# Patient Record
Sex: FEMALE | Race: WHITE | Employment: OTHER | URBAN - NONMETROPOLITAN AREA
[De-identification: names, ages, dates, MRNs, and addresses within clinical notes are randomized per-mention and may not be internally consistent; named-entity substitution may affect disease eponyms.]

---

## 2023-02-10 ENCOUNTER — HOSPITAL ENCOUNTER (OUTPATIENT)
Dept: CT IMAGING | Age: 68
Discharge: HOME | End: 2023-02-10

## 2023-02-10 DIAGNOSIS — Z00.6 ENCOUNTER FOR EXAMINATION FOR NORMAL COMPARISON AND CONTROL IN CLINICAL RESEARCH PROGRAM: ICD-10-CM

## 2023-02-10 RX ORDER — AMOXICILLIN AND CLAVULANATE POTASSIUM 875; 125 MG/1; MG/1
1 TABLET, FILM COATED ORAL 2 TIMES DAILY
COMMUNITY
End: 2023-02-13

## 2023-02-10 RX ORDER — METHYLPREDNISOLONE 4 MG/1
2 TABLET ORAL DAILY
COMMUNITY
End: 2023-02-13

## 2023-02-10 RX ORDER — GEMFIBROZIL 600 MG/1
600 TABLET, FILM COATED ORAL
COMMUNITY

## 2023-02-10 RX ORDER — LOSARTAN POTASSIUM 25 MG/1
25 TABLET ORAL DAILY
COMMUNITY

## 2023-02-10 RX ORDER — VITAMIN B COMPLEX
2000 TABLET ORAL DAILY
COMMUNITY

## 2023-02-10 RX ORDER — LANOLIN ALCOHOL/MO/W.PET/CERES
500 CREAM (GRAM) TOPICAL NIGHTLY
COMMUNITY

## 2023-02-10 RX ORDER — METRONIDAZOLE 500 MG/1
500 TABLET ORAL 3 TIMES DAILY
COMMUNITY
End: 2023-02-13

## 2023-02-10 RX ORDER — CIPROFLOXACIN 500 MG/1
500 TABLET, FILM COATED ORAL 2 TIMES DAILY
COMMUNITY
End: 2023-02-13

## 2023-02-10 RX ORDER — BISOPROLOL FUMARATE AND HYDROCHLOROTHIAZIDE 10; 6.25 MG/1; MG/1
1 TABLET ORAL DAILY
COMMUNITY

## 2023-02-10 RX ORDER — ACYCLOVIR 800 MG/1
800 TABLET ORAL 2 TIMES DAILY
COMMUNITY
End: 2023-02-13

## 2023-02-13 ENCOUNTER — OFFICE VISIT (OUTPATIENT)
Dept: SURGERY | Age: 68
End: 2023-02-13
Payer: MEDICARE

## 2023-02-13 ENCOUNTER — HOSPITAL ENCOUNTER (OUTPATIENT)
Age: 68
Discharge: HOME | End: 2023-02-13
Payer: MEDICARE

## 2023-02-13 VITALS
TEMPERATURE: 97 F | BODY MASS INDEX: 32.69 KG/M2 | SYSTOLIC BLOOD PRESSURE: 133 MMHG | DIASTOLIC BLOOD PRESSURE: 64 MMHG | HEIGHT: 60 IN | WEIGHT: 166.5 LBS | HEART RATE: 68 BPM | OXYGEN SATURATION: 97 %

## 2023-02-13 DIAGNOSIS — I10 ESSENTIAL HYPERTENSION: ICD-10-CM

## 2023-02-13 DIAGNOSIS — G89.29 CHRONIC BILATERAL LOW BACK PAIN, UNSPECIFIED WHETHER SCIATICA PRESENT: ICD-10-CM

## 2023-02-13 DIAGNOSIS — Z01.818 PRE-OP TESTING: ICD-10-CM

## 2023-02-13 DIAGNOSIS — M54.50 CHRONIC BILATERAL LOW BACK PAIN, UNSPECIFIED WHETHER SCIATICA PRESENT: ICD-10-CM

## 2023-02-13 DIAGNOSIS — K40.30 INCARCERATED LEFT INGUINAL HERNIA: ICD-10-CM

## 2023-02-13 DIAGNOSIS — K40.30 INCARCERATED LEFT INGUINAL HERNIA: Primary | ICD-10-CM

## 2023-02-13 LAB
ANION GAP SERPL CALC-SCNC: 10 MEQ/L (ref 8–16)
BUN SERPL-MCNC: 19 MG/DL (ref 7–22)
CALCIUM SERPL-MCNC: 10 MG/DL (ref 8.5–10.5)
CHLORIDE SERPL-SCNC: 99 MEQ/L (ref 98–111)
CO2 SERPL-SCNC: 28 MEQ/L (ref 23–33)
CREAT SERPL-MCNC: 0.7 MG/DL (ref 0.4–1.2)
GFR SERPL CREATININE-BSD FRML MDRD: > 60 ML/MIN/1.73M2
GLUCOSE SERPL-MCNC: 142 MG/DL (ref 70–108)
HCT VFR BLD AUTO: 41.1 % (ref 37–47)
HGB BLD-MCNC: 13.5 GM/DL (ref 12–16)
POTASSIUM SERPL-SCNC: 4.6 MEQ/L (ref 3.5–5.2)
SODIUM SERPL-SCNC: 137 MEQ/L (ref 135–145)

## 2023-02-13 PROCEDURE — G8484 FLU IMMUNIZE NO ADMIN: HCPCS | Performed by: SURGERY

## 2023-02-13 PROCEDURE — 3078F DIAST BP <80 MM HG: CPT | Performed by: SURGERY

## 2023-02-13 PROCEDURE — 85018 HEMOGLOBIN: CPT

## 2023-02-13 PROCEDURE — 3017F COLORECTAL CA SCREEN DOC REV: CPT | Performed by: SURGERY

## 2023-02-13 PROCEDURE — G8417 CALC BMI ABV UP PARAM F/U: HCPCS | Performed by: SURGERY

## 2023-02-13 PROCEDURE — 1123F ACP DISCUSS/DSCN MKR DOCD: CPT | Performed by: SURGERY

## 2023-02-13 PROCEDURE — G8427 DOCREV CUR MEDS BY ELIG CLIN: HCPCS | Performed by: SURGERY

## 2023-02-13 PROCEDURE — 3075F SYST BP GE 130 - 139MM HG: CPT | Performed by: SURGERY

## 2023-02-13 PROCEDURE — 93010 ELECTROCARDIOGRAM REPORT: CPT | Performed by: INTERNAL MEDICINE

## 2023-02-13 PROCEDURE — 36415 COLL VENOUS BLD VENIPUNCTURE: CPT

## 2023-02-13 PROCEDURE — G8400 PT W/DXA NO RESULTS DOC: HCPCS | Performed by: SURGERY

## 2023-02-13 PROCEDURE — 93005 ELECTROCARDIOGRAM TRACING: CPT

## 2023-02-13 PROCEDURE — 80048 BASIC METABOLIC PNL TOTAL CA: CPT

## 2023-02-13 PROCEDURE — 85014 HEMATOCRIT: CPT

## 2023-02-13 PROCEDURE — 1090F PRES/ABSN URINE INCON ASSESS: CPT | Performed by: SURGERY

## 2023-02-13 PROCEDURE — 1036F TOBACCO NON-USER: CPT | Performed by: SURGERY

## 2023-02-13 PROCEDURE — 99203 OFFICE O/P NEW LOW 30 MIN: CPT | Performed by: SURGERY

## 2023-02-13 RX ORDER — AMPICILLIN TRIHYDRATE 250 MG
200 CAPSULE ORAL DAILY
COMMUNITY

## 2023-02-13 RX ORDER — ACETAMINOPHEN 325 MG/1
650 TABLET ORAL EVERY 6 HOURS PRN
COMMUNITY

## 2023-02-13 RX ORDER — COVID-19 ANTIGEN TEST
KIT MISCELLANEOUS DAILY
COMMUNITY

## 2023-02-13 RX ORDER — POTASSIUM CITRATE 10 MEQ/1
10 TABLET, EXTENDED RELEASE ORAL 2 TIMES DAILY
COMMUNITY

## 2023-02-13 RX ORDER — PANTOPRAZOLE SODIUM 40 MG/1
TABLET, DELAYED RELEASE ORAL
COMMUNITY
Start: 2023-01-18

## 2023-02-13 ASSESSMENT — ENCOUNTER SYMPTOMS
COLOR CHANGE: 0
COUGH: 0
BLOOD IN STOOL: 0
VOICE CHANGE: 0
NAUSEA: 0
WHEEZING: 0
VOMITING: 0
BACK PAIN: 1
SHORTNESS OF BREATH: 0
SORE THROAT: 0
ABDOMINAL PAIN: 0
TROUBLE SWALLOWING: 0

## 2023-02-13 NOTE — PROGRESS NOTES
Rody Reynolds MD   General Surgery  New Patient Evaluation in Office  Pt Name: Perla Colón  Date of Birth 1955   Today's Date: 2/13/2023  Medical Record Number: [id number]  Referring Provider: Dr. Beulah Fay  Primary Care Provider: Laila William MD  Chief Complaint:  Chief Complaint   Patient presents with    Surgical Consult     New patient-referred by Dr Umair Spivey groin lump       ASSESSMENT      1. Incarcerated left inguinal hernia    2. Essential hypertension    3. Pre-op testing    4. Chronic bilateral low back pain, unspecified whether sciatica present         PLANS      Clinical examination consistent with incarcerated left inguinal hernia  Recommend surgical repair due to persistent symptoms and incarceration. Open and minimally invasive approaches discussed. Robotic assisted laparoscopic approach discussed. Recommend robotic assisted laparoscopic, possible open repair of symptomatic incarcerated left inguinal hernia. Patient is agreeable. Preoperative testing including EKG and lab work  General anesthesia  Risks of surgery discussed and include but not limited to bleeding, infection, recurrent hernia, persistent pain as well as potential though very low risk for mesh complications. Utilization of mesh discussed as well advantages potential disadvantages. Potential for organ injury bowel injury bladder injury reviewed but low risk. All questions answered patient wishes to proceed. Can Delarosa is a 79y.o. year old female who is presenting today in the office for evaluation of a persistent lump in the left groin. She noted it around August and has never gone away. It is a little bit softer than it used to be. She denies any real change in bowel or urinary habits. She has a history of diverticulosis with intermittent episodes of mild diverticulitis. She has never had an admission for this. Previous colonoscopies have been unremarkable. Diverticulosis benign polyp.   Madelaine Ladpamela has chronic lower back pain. She is due for an SI joint injection. She has had previous back surgery and a torn gluteus medius on the left side. She thought the discomfort which radiates into her groin may be related to her back. She has had no change in bowel or urinary habits. On examination the palpable mass in the left groin is not reducible and consistent with an incarcerated groin hernia. Prior CT imaging reviewed there does appear to be a widened mouth at the internal ring with some contents at that time. Patient is agreeable to surgical intervention she has persistent symptoms of discomfort in the area. There is a remote history of a PE. Hypercoagulable work-up negative. Performed in [place] in 2007.     Past Medical History  Past Medical History:   Diagnosis Date    Arthritis     Diverticulosis     Hyperlipidemia     Hypertension     Kidney stone     Pulmonary embolism [facility])        Past Surgical History  Past Surgical History:   Procedure Laterality Date    COLONOSCOPY  11/2022    COLONOSCOPY  2012    ESOPHAGOGASTRODUODENOSCOPY  2012    LITHOTRIPSY      LUMBAR FUSION      OTHER SURGICAL HISTORY      repair of left gluteus medius tear    OTHER SURGICAL HISTORY      SIJ injection       Medications  Current Outpatient Medications   Medication Sig Dispense Refill    pantoprazole (PROTONIX) 40 MG tablet TAKE 1 TABLET BY MOUTH ONCE DAILY      potassium citrate (UROCIT-K) 10 MEQ (1080 MG) extended release tablet Take 10 mEq by mouth in the morning and at bedtime      Coenzyme Q10 (COQ10) 200 MG CAPS Take 200 mg by mouth daily      Naproxen Sodium (ALEVE) 220 MG CAPS Take by mouth daily      acetaminophen (TYLENOL) 325 MG tablet Take 650 mg by mouth every 6 hours as needed for Pain      aspirin 325 MG EC tablet Take 325 mg by mouth daily      bisoprolol-hydroCHLOROthiazide (ZIAC) 10-6.25 MG per tablet Take 1 tablet by mouth daily      gemfibrozil (LOPID) 600 MG tablet Take 600 mg by mouth 2 times daily (before meals)      losartan (COZAAR) 25 MG tablet Take 25 mg by mouth daily      niacin (SLO-NIACIN) 500 MG extended release tablet Take 500 mg by mouth nightly      Vitamin D (CHOLECALCIFEROL) 25 MCG (1000 UT) TABS tablet Take 2,000 Units by mouth daily       No current facility-administered medications for this visit. Allergies     Allergies   Allergen Reactions    Cephalosporins Anaphylaxis    Cymbalta [Duloxetine Hcl] Hives    Gabapentin Hives       Family History  Family History   Problem Relation Age of Onset    Heart Failure Mother     Heart Failure Father        SocialHistory  Social History     Socioeconomic History    Marital status:      Spouse name: Not on file    Number of children: Not on file    Years of education: Not on file    Highest education level: Not on file   Occupational History    Not on file   Tobacco Use    Smoking status: Never    Smokeless tobacco: Never   Substance and Sexual Activity    Alcohol use: Not Currently    Drug use: Never    Sexual activity: Not on file   Other Topics Concern    Not on file   Social History Narrative    Not on file     Social Determinants of Health     Financial Resource Strain: Not on file   Food Insecurity: Not on file   Transportation Needs: Not on file   Physical Activity: Not on file   Stress: Not on file   Social Connections: Not on file   Intimate Partner Violence: Not on file   Housing Stability: Not on file           Review of Systems  Review of Systems   Constitutional:  Negative for chills, fatigue, fever and unexpected weight change. HENT:  Negative for sore throat, trouble swallowing and voice change. Eyes:  Negative for visual disturbance. Respiratory:  Negative for cough, shortness of breath and wheezing. Cardiovascular:  Negative for chest pain and palpitations. Gastrointestinal:  Negative for abdominal pain, blood in stool, nausea and vomiting.    Endocrine: Negative for cold intolerance, heat intolerance and polydipsia. Genitourinary:  Negative for dysuria, flank pain and hematuria. Musculoskeletal:  Positive for back pain and gait problem. Negative for joint swelling and myalgias. Skin:  Negative for color change and rash. Allergic/Immunologic: Negative for immunocompromised state. Neurological:  Negative for dizziness, tremors, seizures and speech difficulty. Hematological:  Does not bruise/bleed easily. Psychiatric/Behavioral:  Negative for agitation, behavioral problems and confusion. Reports chronic gait problems since back surgery in the past.  OBJECTIVE     /64 (Site: Left Upper Arm, Position: Sitting, Cuff Size: Medium Adult)   Pulse 68   Temp 97 °F (36.1 °C) (Temporal)   Ht 5' (1.524 m)   Wt 166 lb 8 oz (75.5 kg)   SpO2 97%   BMI 32.52 kg/m²      Physical Exam  Vitals reviewed. Constitutional:       Appearance: Normal appearance. She is well-developed. She is not diaphoretic. HENT:      Head: Normocephalic and atraumatic. Nose: No congestion or rhinorrhea. Eyes:      General: No scleral icterus. Extraocular Movements: Extraocular movements intact. Pupils: Pupils are equal, round, and reactive to light. Comments: glasses   Neck:      Thyroid: No thyromegaly. Vascular: No JVD. Trachea: No tracheal deviation. Cardiovascular:      Rate and Rhythm: Normal rate and regular rhythm. Heart sounds: Normal heart sounds. No murmur heard. Pulmonary:      Effort: No respiratory distress. Breath sounds: Normal breath sounds. No wheezing. Abdominal:      General: There is no distension. Palpations: Abdomen is soft. There is no mass. Tenderness: There is no abdominal tenderness. There is no guarding or rebound. Hernia: A hernia is present. Musculoskeletal:         General: No tenderness or deformity. Cervical back: Neck supple. No rigidity. Lymphadenopathy:      Cervical: No cervical adenopathy.    Skin:     General: Skin is warm and dry. Coloration: Skin is not jaundiced or pale. Findings: No rash. Neurological:      General: No focal deficit present. Mental Status: She is alert and oriented to person, place, and time. Cranial Nerves: No cranial nerve deficit.    Psychiatric:         Mood and Affect: Mood normal.         Behavior: Behavior normal.       Lab Results   Component Value Date    WBC 6.4 03/23/2022    HGB 13.4 03/23/2022    HCT 40.5 03/23/2022     03/23/2022    CHOL 180 03/23/2022    TRIG 362 (H) 03/23/2022    HDL 36 (L) 03/23/2022    LDLDIRECT 101 (H) 03/23/2022    ALT 21 03/23/2022    AST 17 03/23/2022     03/23/2022    K 4.1 03/23/2022     03/23/2022    CREATININE 0.6 09/23/2022    BUN 21 (H) 03/23/2022    CO2 28 03/23/2022    TSH 2.633 03/23/2022

## 2023-02-17 LAB
EKG ATRIAL RATE: 66 BPM
EKG P AXIS: 37 DEGREES
EKG P-R INTERVAL: 170 MS
EKG Q-T INTERVAL: 414 MS
EKG QRS DURATION: 80 MS
EKG QTC CALCULATION (BAZETT): 434 MS
EKG R AXIS: 33 DEGREES
EKG T AXIS: 41 DEGREES
EKG VENTRICULAR RATE: 66 BPM

## 2023-02-28 ENCOUNTER — ANESTHESIA EVENT (OUTPATIENT)
Dept: OPERATING ROOM | Age: 68
End: 2023-02-28
Payer: MEDICARE

## 2023-03-01 ENCOUNTER — HOSPITAL ENCOUNTER (OUTPATIENT)
Age: 68
Setting detail: OUTPATIENT SURGERY
Discharge: HOME | End: 2023-03-01
Attending: SURGERY | Admitting: SURGERY
Payer: MEDICARE

## 2023-03-01 ENCOUNTER — ANESTHESIA (OUTPATIENT)
Dept: OPERATING ROOM | Age: 68
End: 2023-03-01
Payer: MEDICARE

## 2023-03-01 VITALS
HEART RATE: 67 BPM | RESPIRATION RATE: 12 BRPM | BODY MASS INDEX: 32.28 KG/M2 | WEIGHT: 164.4 LBS | OXYGEN SATURATION: 93 % | SYSTOLIC BLOOD PRESSURE: 110 MMHG | HEIGHT: 60 IN | DIASTOLIC BLOOD PRESSURE: 56 MMHG | TEMPERATURE: 96.4 F

## 2023-03-01 DIAGNOSIS — R59.0 LYMPHADENOPATHY, INGUINAL: Primary | ICD-10-CM

## 2023-03-01 DIAGNOSIS — K40.90 LEFT INGUINAL HERNIA: ICD-10-CM

## 2023-03-01 PROBLEM — D17.24 LIPOMA OF LEFT LOWER EXTREMITY: Status: ACTIVE | Noted: 2023-03-01

## 2023-03-01 LAB — POTASSIUM SERPL-SCNC: 4.3 MEQ/L (ref 3.5–5.2)

## 2023-03-01 PROCEDURE — 88305 TISSUE EXAM BY PATHOLOGIST: CPT

## 2023-03-01 PROCEDURE — 3600000012 HC SURGERY LEVEL 2 ADDTL 15MIN: Performed by: SURGERY

## 2023-03-01 PROCEDURE — 2500000003 HC RX 250 WO HCPCS: Performed by: REGISTERED NURSE

## 2023-03-01 PROCEDURE — 2580000003 HC RX 258: Performed by: REGISTERED NURSE

## 2023-03-01 PROCEDURE — 2500000003 HC RX 250 WO HCPCS: Performed by: SURGERY

## 2023-03-01 PROCEDURE — 2709999900 HC NON-CHARGEABLE SUPPLY: Performed by: SURGERY

## 2023-03-01 PROCEDURE — 2580000003 HC RX 258: Performed by: SURGERY

## 2023-03-01 PROCEDURE — 7100000001 HC PACU RECOVERY - ADDTL 15 MIN: Performed by: SURGERY

## 2023-03-01 PROCEDURE — 6360000002 HC RX W HCPCS: Performed by: ANESTHESIOLOGY

## 2023-03-01 PROCEDURE — 3700000001 HC ADD 15 MINUTES (ANESTHESIA): Performed by: SURGERY

## 2023-03-01 PROCEDURE — 27043 EXC HIP PELVIS LES SC 3 CM/>: CPT | Performed by: SURGERY

## 2023-03-01 PROCEDURE — 7100000011 HC PHASE II RECOVERY - ADDTL 15 MIN: Performed by: SURGERY

## 2023-03-01 PROCEDURE — 88304 TISSUE EXAM BY PATHOLOGIST: CPT

## 2023-03-01 PROCEDURE — 84132 ASSAY OF SERUM POTASSIUM: CPT

## 2023-03-01 PROCEDURE — 36415 COLL VENOUS BLD VENIPUNCTURE: CPT

## 2023-03-01 PROCEDURE — 6370000000 HC RX 637 (ALT 250 FOR IP): Performed by: SURGERY

## 2023-03-01 PROCEDURE — 3700000000 HC ANESTHESIA ATTENDED CARE: Performed by: SURGERY

## 2023-03-01 PROCEDURE — 7100000010 HC PHASE II RECOVERY - FIRST 15 MIN: Performed by: SURGERY

## 2023-03-01 PROCEDURE — 6360000002 HC RX W HCPCS

## 2023-03-01 PROCEDURE — 3600000002 HC SURGERY LEVEL 2 BASE: Performed by: SURGERY

## 2023-03-01 PROCEDURE — 7100000000 HC PACU RECOVERY - FIRST 15 MIN: Performed by: SURGERY

## 2023-03-01 PROCEDURE — 6360000002 HC RX W HCPCS: Performed by: REGISTERED NURSE

## 2023-03-01 RX ORDER — DROPERIDOL 2.5 MG/ML
INJECTION, SOLUTION INTRAMUSCULAR; INTRAVENOUS
Status: COMPLETED
Start: 2023-03-01 | End: 2023-03-01

## 2023-03-01 RX ORDER — SODIUM CHLORIDE 0.9 % (FLUSH) 0.9 %
5-40 SYRINGE (ML) INJECTION PRN
Status: DISCONTINUED | OUTPATIENT
Start: 2023-03-01 | End: 2023-03-01 | Stop reason: HOSPADM

## 2023-03-01 RX ORDER — ONDANSETRON 2 MG/ML
4 INJECTION INTRAMUSCULAR; INTRAVENOUS
Status: DISCONTINUED | OUTPATIENT
Start: 2023-03-01 | End: 2023-03-01 | Stop reason: HOSPADM

## 2023-03-01 RX ORDER — KETOROLAC TROMETHAMINE 30 MG/ML
INJECTION, SOLUTION INTRAMUSCULAR; INTRAVENOUS PRN
Status: DISCONTINUED | OUTPATIENT
Start: 2023-03-01 | End: 2023-03-01 | Stop reason: SDUPTHER

## 2023-03-01 RX ORDER — PROPOFOL 10 MG/ML
INJECTION, EMULSION INTRAVENOUS PRN
Status: DISCONTINUED | OUTPATIENT
Start: 2023-03-01 | End: 2023-03-01 | Stop reason: SDUPTHER

## 2023-03-01 RX ORDER — SODIUM CHLORIDE 0.9 % (FLUSH) 0.9 %
5-40 SYRINGE (ML) INJECTION EVERY 12 HOURS SCHEDULED
Status: DISCONTINUED | OUTPATIENT
Start: 2023-03-01 | End: 2023-03-01 | Stop reason: HOSPADM

## 2023-03-01 RX ORDER — SODIUM CHLORIDE, SODIUM LACTATE, POTASSIUM CHLORIDE, CALCIUM CHLORIDE 600; 310; 30; 20 MG/100ML; MG/100ML; MG/100ML; MG/100ML
INJECTION, SOLUTION INTRAVENOUS CONTINUOUS PRN
Status: DISCONTINUED | OUTPATIENT
Start: 2023-03-01 | End: 2023-03-01 | Stop reason: SDUPTHER

## 2023-03-01 RX ORDER — BUPIVACAINE HYDROCHLORIDE 2.5 MG/ML
INJECTION, SOLUTION EPIDURAL; INFILTRATION; INTRACAUDAL PRN
Status: DISCONTINUED | OUTPATIENT
Start: 2023-03-01 | End: 2023-03-01 | Stop reason: ALTCHOICE

## 2023-03-01 RX ORDER — TRAMADOL HYDROCHLORIDE 50 MG/1
100 TABLET ORAL EVERY 6 HOURS PRN
Status: DISCONTINUED | OUTPATIENT
Start: 2023-03-01 | End: 2023-03-01 | Stop reason: HOSPADM

## 2023-03-01 RX ORDER — ROCURONIUM BROMIDE 10 MG/ML
INJECTION, SOLUTION INTRAVENOUS PRN
Status: DISCONTINUED | OUTPATIENT
Start: 2023-03-01 | End: 2023-03-01 | Stop reason: SDUPTHER

## 2023-03-01 RX ORDER — FENTANYL CITRATE 50 UG/ML
INJECTION, SOLUTION INTRAMUSCULAR; INTRAVENOUS PRN
Status: DISCONTINUED | OUTPATIENT
Start: 2023-03-01 | End: 2023-03-01 | Stop reason: SDUPTHER

## 2023-03-01 RX ORDER — ENOXAPARIN SODIUM 100 MG/ML
40 INJECTION SUBCUTANEOUS DAILY
Status: DISCONTINUED | OUTPATIENT
Start: 2023-03-01 | End: 2023-03-01 | Stop reason: HOSPADM

## 2023-03-01 RX ORDER — DEXAMETHASONE SODIUM PHOSPHATE 10 MG/ML
INJECTION, EMULSION INTRAMUSCULAR; INTRAVENOUS PRN
Status: DISCONTINUED | OUTPATIENT
Start: 2023-03-01 | End: 2023-03-01 | Stop reason: SDUPTHER

## 2023-03-01 RX ORDER — ONDANSETRON 2 MG/ML
INJECTION INTRAMUSCULAR; INTRAVENOUS PRN
Status: DISCONTINUED | OUTPATIENT
Start: 2023-03-01 | End: 2023-03-01 | Stop reason: SDUPTHER

## 2023-03-01 RX ORDER — HYDROCODONE BITARTRATE AND ACETAMINOPHEN 5; 325 MG/1; MG/1
1 TABLET ORAL ONCE
Status: COMPLETED | OUTPATIENT
Start: 2023-03-01 | End: 2023-03-01

## 2023-03-01 RX ORDER — DROPERIDOL 2.5 MG/ML
0.62 INJECTION, SOLUTION INTRAMUSCULAR; INTRAVENOUS EVERY 6 HOURS PRN
Status: DISCONTINUED | OUTPATIENT
Start: 2023-03-01 | End: 2023-03-01 | Stop reason: HOSPADM

## 2023-03-01 RX ORDER — SODIUM CHLORIDE 9 MG/ML
INJECTION, SOLUTION INTRAVENOUS CONTINUOUS
Status: DISCONTINUED | OUTPATIENT
Start: 2023-03-01 | End: 2023-03-01 | Stop reason: HOSPADM

## 2023-03-01 RX ORDER — LABETALOL HYDROCHLORIDE 5 MG/ML
10 INJECTION, SOLUTION INTRAVENOUS
Status: DISCONTINUED | OUTPATIENT
Start: 2023-03-01 | End: 2023-03-01 | Stop reason: HOSPADM

## 2023-03-01 RX ORDER — CLINDAMYCIN PHOSPHATE 900 MG/50ML
900 INJECTION INTRAVENOUS ONCE
Status: COMPLETED | OUTPATIENT
Start: 2023-03-01 | End: 2023-03-01

## 2023-03-01 RX ORDER — TRAMADOL HYDROCHLORIDE 50 MG/1
50 TABLET ORAL EVERY 6 HOURS PRN
Status: DISCONTINUED | OUTPATIENT
Start: 2023-03-01 | End: 2023-03-01 | Stop reason: HOSPADM

## 2023-03-01 RX ORDER — HYDROCODONE BITARTRATE AND ACETAMINOPHEN 5; 325 MG/1; MG/1
1 TABLET ORAL EVERY 4 HOURS PRN
Qty: 18 TABLET | Refills: 0 | Status: SHIPPED | OUTPATIENT
Start: 2023-03-01 | End: 2023-03-04

## 2023-03-01 RX ORDER — SODIUM CHLORIDE 9 MG/ML
INJECTION, SOLUTION INTRAVENOUS PRN
Status: DISCONTINUED | OUTPATIENT
Start: 2023-03-01 | End: 2023-03-01 | Stop reason: HOSPADM

## 2023-03-01 RX ORDER — HYDRALAZINE HYDROCHLORIDE 20 MG/ML
10 INJECTION INTRAMUSCULAR; INTRAVENOUS
Status: DISCONTINUED | OUTPATIENT
Start: 2023-03-01 | End: 2023-03-01 | Stop reason: HOSPADM

## 2023-03-01 RX ORDER — MORPHINE SULFATE 2 MG/ML
2 INJECTION, SOLUTION INTRAMUSCULAR; INTRAVENOUS
Status: DISCONTINUED | OUTPATIENT
Start: 2023-03-01 | End: 2023-03-01 | Stop reason: HOSPADM

## 2023-03-01 RX ORDER — ONDANSETRON 2 MG/ML
4 INJECTION INTRAMUSCULAR; INTRAVENOUS EVERY 6 HOURS PRN
Status: DISCONTINUED | OUTPATIENT
Start: 2023-03-01 | End: 2023-03-01 | Stop reason: HOSPADM

## 2023-03-01 RX ORDER — MORPHINE SULFATE 2 MG/ML
4 INJECTION, SOLUTION INTRAMUSCULAR; INTRAVENOUS
Status: DISCONTINUED | OUTPATIENT
Start: 2023-03-01 | End: 2023-03-01 | Stop reason: HOSPADM

## 2023-03-01 RX ORDER — ACETAMINOPHEN 325 MG/1
650 TABLET ORAL EVERY 4 HOURS PRN
Status: DISCONTINUED | OUTPATIENT
Start: 2023-03-01 | End: 2023-03-01 | Stop reason: HOSPADM

## 2023-03-01 RX ORDER — ONDANSETRON 4 MG/1
4 TABLET, ORALLY DISINTEGRATING ORAL EVERY 8 HOURS PRN
Status: DISCONTINUED | OUTPATIENT
Start: 2023-03-01 | End: 2023-03-01 | Stop reason: HOSPADM

## 2023-03-01 RX ADMIN — ROCURONIUM BROMIDE 50 MG: 10 INJECTION, SOLUTION INTRAVENOUS at 07:30

## 2023-03-01 RX ADMIN — FENTANYL CITRATE 50 MCG: 50 INJECTION, SOLUTION INTRAMUSCULAR; INTRAVENOUS at 07:27

## 2023-03-01 RX ADMIN — HYDROMORPHONE HYDROCHLORIDE 0.5 MG: 1 INJECTION, SOLUTION INTRAMUSCULAR; INTRAVENOUS; SUBCUTANEOUS at 08:30

## 2023-03-01 RX ADMIN — Medication 60 MG: at 07:30

## 2023-03-01 RX ADMIN — CLINDAMYCIN PHOSPHATE 900 MG: 900 INJECTION, SOLUTION INTRAVENOUS at 07:35

## 2023-03-01 RX ADMIN — SUGAMMADEX 200 MG: 100 INJECTION, SOLUTION INTRAVENOUS at 08:17

## 2023-03-01 RX ADMIN — HYDROMORPHONE HYDROCHLORIDE 0.5 MG: 1 INJECTION, SOLUTION INTRAMUSCULAR; INTRAVENOUS; SUBCUTANEOUS at 08:35

## 2023-03-01 RX ADMIN — ONDANSETRON 4 MG: 2 INJECTION INTRAMUSCULAR; INTRAVENOUS at 07:35

## 2023-03-01 RX ADMIN — DROPERIDOL 0.62 MG: 2.5 INJECTION, SOLUTION INTRAMUSCULAR; INTRAVENOUS at 08:30

## 2023-03-01 RX ADMIN — SODIUM CHLORIDE, POTASSIUM CHLORIDE, SODIUM LACTATE AND CALCIUM CHLORIDE: 600; 310; 30; 20 INJECTION, SOLUTION INTRAVENOUS at 08:00

## 2023-03-01 RX ADMIN — PROPOFOL 150 MG: 10 INJECTION, EMULSION INTRAVENOUS at 07:30

## 2023-03-01 RX ADMIN — KETOROLAC TROMETHAMINE 30 MG: 30 INJECTION, SOLUTION INTRAMUSCULAR; INTRAVENOUS at 08:09

## 2023-03-01 RX ADMIN — SODIUM CHLORIDE: 9 INJECTION, SOLUTION INTRAVENOUS at 06:39

## 2023-03-01 RX ADMIN — HYDROCODONE BITARTRATE AND ACETAMINOPHEN 1 TABLET: 5; 325 TABLET ORAL at 11:02

## 2023-03-01 RX ADMIN — FENTANYL CITRATE 50 MCG: 50 INJECTION, SOLUTION INTRAMUSCULAR; INTRAVENOUS at 07:49

## 2023-03-01 RX ADMIN — DEXAMETHASONE SODIUM PHOSPHATE 8 MG: 10 INJECTION, EMULSION INTRAMUSCULAR; INTRAVENOUS at 07:35

## 2023-03-01 ASSESSMENT — PAIN DESCRIPTION - ORIENTATION
ORIENTATION: LOWER
ORIENTATION: LEFT

## 2023-03-01 ASSESSMENT — PAIN DESCRIPTION - LOCATION
LOCATION: ABDOMEN
LOCATION: ABDOMEN
LOCATION: GROIN

## 2023-03-01 ASSESSMENT — PAIN DESCRIPTION - DESCRIPTORS
DESCRIPTORS: ACHING;DISCOMFORT
DESCRIPTORS: ACHING
DESCRIPTORS: STABBING;SHARP
DESCRIPTORS: ACHING;DISCOMFORT

## 2023-03-01 ASSESSMENT — PAIN SCALES - GENERAL
PAINLEVEL_OUTOF10: 4
PAINLEVEL_OUTOF10: 3
PAINLEVEL_OUTOF10: 7
PAINLEVEL_OUTOF10: 3
PAINLEVEL_OUTOF10: 3

## 2023-03-01 ASSESSMENT — PAIN DESCRIPTION - FREQUENCY: FREQUENCY: CONTINUOUS

## 2023-03-01 ASSESSMENT — PAIN - FUNCTIONAL ASSESSMENT
PAIN_FUNCTIONAL_ASSESSMENT: 0-10
PAIN_FUNCTIONAL_ASSESSMENT: PREVENTS OR INTERFERES SOME ACTIVE ACTIVITIES AND ADLS

## 2023-03-01 ASSESSMENT — PAIN DESCRIPTION - PAIN TYPE
TYPE: SURGICAL PAIN
TYPE: ACUTE PAIN

## 2023-03-01 NOTE — ANESTHESIA PRE PROCEDURE
Department of Anesthesiology  Preprocedure Note       Name:  Xander Doe   Age:  79 y.o.  :  1955                                          MRN:  [id number]         Date:  3/1/2023      Surgeon: Elkin Matta):  Paulina Morrissey MD    Procedure: Procedure(s):  ROBOTIC LEFT INGUINAL HERNIA REPAIR WITH MESH, POSS RIGHT, POSS OPEN    Medications prior to admission:   Prior to Admission medications    Medication Sig Start Date End Date Taking?  Authorizing Provider   pantoprazole (PROTONIX) 40 MG tablet TAKE 1 TABLET BY MOUTH ONCE DAILY 23   Historical Provider, MD   potassium citrate (UROCIT-K) 10 MEQ (1080 MG) extended release tablet Take 10 mEq by mouth in the morning and at bedtime    Historical Provider, MD   Coenzyme Q10 (COQ10) 200 MG CAPS Take 200 mg by mouth daily    Historical Provider, MD   Naproxen Sodium 220 MG CAPS Take by mouth daily    Historical Provider, MD   acetaminophen (TYLENOL) 325 MG tablet Take 650 mg by mouth every 6 hours as needed for Pain    Historical Provider, MD   aspirin 325 MG EC tablet Take 325 mg by mouth daily    Historical Provider, MD   bisoprolol-hydroCHLOROthiazide (ZIAC) 10-6.25 MG per tablet Take 1 tablet by mouth daily    Historical Provider, MD   gemfibrozil (LOPID) 600 MG tablet Take 600 mg by mouth 2 times daily (before meals)    Historical Provider, MD   losartan (COZAAR) 25 MG tablet Take 25 mg by mouth daily    Historical Provider, MD   niacin (SLO-NIACIN) 500 MG extended release tablet Take 500 mg by mouth nightly    Historical Provider, MD   Vitamin D (CHOLECALCIFEROL) 25 MCG (1000 UT) TABS tablet Take 2,000 Units by mouth daily    Historical Provider, MD       Current medications:    Current Facility-Administered Medications   Medication Dose Route Frequency Provider Last Rate Last Admin    0.9 % sodium chloride infusion   IntraVENous Continuous Paulina Morrissey  mL/hr at 23 0639 New Bag at 23 0639    sodium chloride flush 0.9 % injection 5-40 mL  5-40 mL IntraVENous 2 times per day Poornima Gupta MD        sodium chloride flush 0.9 % injection 5-40 mL  5-40 mL IntraVENous PRN Poornima Gupta MD        0.9 % sodium chloride infusion   IntraVENous PRN Poornima Gupta MD        clindamycin (CLEOCIN) 900 mg in dextrose 5 % 50 mL IVPB  900 mg IntraVENous Once Poornima Gupta MD           Allergies: Allergies   Allergen Reactions    Cephalosporins Anaphylaxis    Cymbalta [Duloxetine Hcl] Hives    Gabapentin Hives       Problem List:  There is no problem list on file for this patient.       Past Medical History:        Diagnosis Date    Arthritis     Diverticulosis     Hyperlipidemia     Hypertension     Kidney stone     Pulmonary embolism ([address] 2005       Past Surgical History:        Procedure Laterality Date    CERVICAL LAMINECTOMY  2007    CHOLECYSTECTOMY      COLONOSCOPY  11/2022    COLONOSCOPY  2012    ESOPHAGOGASTRODUODENOSCOPY  2012    LITHOTRIPSY      LUMBAR FUSION      OTHER SURGICAL HISTORY      repair of left gluteus medius tear    OTHER SURGICAL HISTORY      SIJ injection    ROTATOR CUFF REPAIR Right        Social History:    Social History     Tobacco Use    Smoking status: Never    Smokeless tobacco: Never   Substance Use Topics    Alcohol use: Not Currently                                Counseling given: Not Answered      Vital Signs (Current):   Vitals:    03/01/23 0616   BP: 130/68   Pulse: 70   Resp: 16   Temp: 96.8 °F (36 °C)   TempSrc: Temporal   SpO2: 95%   Weight: 164 lb 6.4 oz (74.6 kg)   Height: 5' (1.524 m)                                              BP Readings from Last 3 Encounters:   03/01/23 130/68   02/13/23 133/64       NPO Status: Time of last liquid consumption: 2200                        Time of last solid consumption: 2200                        Date of last liquid consumption: 02/28/23                        Date of last solid food consumption: 02/28/23    BMI:   Wt Readings from Last 3 Encounters: 03/01/23 164 lb 6.4 oz (74.6 kg)   02/13/23 166 lb 8 oz (75.5 kg)     Body mass index is 32.11 kg/m². CBC:   Lab Results   Component Value Date/Time    WBC 6.4 03/23/2022 10:54 AM    RBC 4.68 03/23/2022 10:54 AM    HGB 13.5 02/13/2023 10:20 AM    HCT 41.1 02/13/2023 10:20 AM    MCV 86.5 03/23/2022 10:54 AM    RDW 14.6 03/23/2022 10:54 AM     03/23/2022 10:54 AM       CMP:   Lab Results   Component Value Date/Time     02/13/2023 10:20 AM    K 4.3 03/01/2023 06:24 AM    CL 99 02/13/2023 10:20 AM    CO2 28 02/13/2023 10:20 AM    BUN 19 02/13/2023 10:20 AM    CREATININE 0.7 02/13/2023 10:20 AM    AGRATIO 1.7 03/23/2022 10:54 AM    LABGLOM >60 02/13/2023 10:20 AM    GLUCOSE 142 02/13/2023 10:20 AM    GLUCOSE 122 03/23/2022 10:54 AM    PROT 6.8 03/23/2022 10:54 AM    CALCIUM 10.0 02/13/2023 10:20 AM    BILITOT 0.7 03/23/2022 10:54 AM    ALKPHOS 76 03/23/2022 10:54 AM    AST 17 03/23/2022 10:54 AM    ALT 21 03/23/2022 10:54 AM       POC Tests: No results for input(s): POCGLU, POCNA, POCK, POCCL, POCBUN, POCHEMO, POCHCT in the last 72 hours. Coags: No results found for: PROTIME, INR, APTT    HCG (If Applicable): No results found for: PREGTESTUR, PREGSERUM, HCG, HCGQUANT     ABGs: No results found for: PHART, PO2ART, WSM3BOX, RAY0ULQ, BEART, Z2ZIFQEL     Type & Screen (If Applicable):  No results found for: LABABO, LABRH    Drug/Infectious Status (If Applicable):  No results found for: HIV, HEPCAB    COVID-19 Screening (If Applicable): No results found for: COVID19        Anesthesia Evaluation   no history of anesthetic complications:   Airway: Mallampati: II  TM distance: >3 FB   Neck ROM: full  Mouth opening: > = 3 FB   Dental:          Pulmonary:normal exam              Patient did not smoke on day of surgery.                  Cardiovascular:    (+) hypertension:, hyperlipidemia                  Neuro/Psych:   Negative Neuro/Psych ROS              GI/Hepatic/Renal:   (+) renal disease: kidney stones, Endo/Other: Negative Endo/Other ROS             Pt had no PAT visit       Abdominal:             Vascular:   + PE. Other Findings:           Anesthesia Plan      general     ASA 3       Induction: intravenous. MIPS: Postoperative opioids intended and Prophylactic antiemetics administered. Anesthetic plan and risks discussed with patient. Plan discussed with CRNA.                     Fartun Ngo MD   3/1/2023

## 2023-03-01 NOTE — PROGRESS NOTES
Patient continues to maintain O2 levels above 92% on room air. Pt has met discharge criteria and states she is ready for discharge to home. IV removed, gauze and tape applied. Dressed in own clothes and personal belongings gathered. Discharge instructions (with opioid medication education information) given to pt and family; pt and family verbalized understanding of discharge instructions, prescriptions and follow up appointments. Pt transported to discharge lobby by [facility] staff.

## 2023-03-01 NOTE — H&P
[address] [address]  History and Physical Update    Pt Name: Genny Hazel  MRN: [id number]  YOB: 1955  Date of evaluation: 3/1/2023    [x] I have examined the patient and reviewed the H&P/Consult and there are no changes to the patient or plans. [] I have examined the patient and reviewed the H&P/Consult and have noted the following changes:        Tala Altman MD MD  Electronically signed 3/1/2023 at 6:30 AM   Tala Altman MD   General Surgery  New Patient Evaluation in Office  Pt Name: Genny Hazel  Date of Birth 1955   Today's Date: 2/13/2023  Medical Record Number: [id number]  Referring Provider: Dr. Cornelio Brown  Primary Care Provider: Marisabel Pritchett MD  Chief Complaint:       Chief Complaint   Patient presents with    Surgical Consult       New patient-referred by Dr Bethany Ray groin lump         ASSESSMENT   1. Incarcerated left inguinal hernia    2. Essential hypertension    3. Pre-op testing    4. Chronic bilateral low back pain, unspecified whether sciatica present       PLANS   Clinical examination consistent with incarcerated left inguinal hernia  Recommend surgical repair due to persistent symptoms and incarceration. Open and minimally invasive approaches discussed. Robotic assisted laparoscopic approach discussed. Recommend robotic assisted laparoscopic, possible open repair of symptomatic incarcerated left inguinal hernia. Patient is agreeable. Preoperative testing including EKG and lab work  General anesthesia  Risks of surgery discussed and include but not limited to bleeding, infection, recurrent hernia, persistent pain as well as potential though very low risk for mesh complications. Utilization of mesh discussed as well advantages potential disadvantages. Potential for organ injury bowel injury bladder injury reviewed but low risk. All questions answered patient wishes to proceed.          Felisa Dubose is a 79y.o. year old female who is presenting today in the office for evaluation of a persistent lump in the left groin. She noted it around August and has never gone away. It is a little bit softer than it used to be. She denies any real change in bowel or urinary habits. She has a history of diverticulosis with intermittent episodes of mild diverticulitis. She has never had an admission for this. Previous colonoscopies have been unremarkable. Diverticulosis benign polyp. Rashawn Gray has chronic lower back pain. She is due for an SI joint injection. She has had previous back surgery and a torn gluteus medius on the left side. She thought the discomfort which radiates into her groin may be related to her back. She has had no change in bowel or urinary habits. On examination the palpable mass in the left groin is not reducible and consistent with an incarcerated groin hernia. Prior CT imaging reviewed there does appear to be a widened mouth at the internal ring with some contents at that time. Patient is agreeable to surgical intervention she has persistent symptoms of discomfort in the area. There is a remote history of a PE. Hypercoagulable work-up negative. Performed in [facility] in 2007.      Past Medical History  Past Medical History        Past Medical History:   Diagnosis Date    Arthritis      Diverticulosis      Hyperlipidemia      Hypertension      Kidney stone      Pulmonary embolism [facility])            Past Surgical History  Past Surgical History         Past Surgical History:   Procedure Laterality Date    COLONOSCOPY   11/2022    COLONOSCOPY   2012    ESOPHAGOGASTRODUODENOSCOPY   2012    LITHOTRIPSY        LUMBAR FUSION        OTHER SURGICAL HISTORY         repair of left gluteus medius tear    OTHER SURGICAL HISTORY         SIJ injection          Medications  Current Facility-Administered Medications          Current Outpatient Medications   Medication Sig Dispense Refill    pantoprazole (PROTONIX) 40 MG tablet TAKE 1 TABLET BY MOUTH ONCE DAILY        potassium citrate (UROCIT-K) 10 MEQ (1080 MG) extended release tablet Take 10 mEq by mouth in the morning and at bedtime        Coenzyme Q10 (COQ10) 200 MG CAPS Take 200 mg by mouth daily        Naproxen Sodium (ALEVE) 220 MG CAPS Take by mouth daily        acetaminophen (TYLENOL) 325 MG tablet Take 650 mg by mouth every 6 hours as needed for Pain        aspirin 325 MG EC tablet Take 325 mg by mouth daily        bisoprolol-hydroCHLOROthiazide (ZIAC) 10-6.25 MG per tablet Take 1 tablet by mouth daily        gemfibrozil (LOPID) 600 MG tablet Take 600 mg by mouth 2 times daily (before meals)        losartan (COZAAR) 25 MG tablet Take 25 mg by mouth daily        niacin (SLO-NIACIN) 500 MG extended release tablet Take 500 mg by mouth nightly        Vitamin D (CHOLECALCIFEROL) 25 MCG (1000 UT) TABS tablet Take 2,000 Units by mouth daily          No current facility-administered medications for this visit.         Allergies          Allergies   Allergen Reactions    Cephalosporins Anaphylaxis    Cymbalta [Duloxetine Hcl] Hives    Gabapentin Hives       Family History  Family History         Family History   Problem Relation Age of Onset    Heart Failure Mother      Heart Failure Father            SocialHistory  Social History               Socioeconomic History    Marital status:        Spouse name: Not on file    Number of children: Not on file    Years of education: Not on file    Highest education level: Not on file   Occupational History    Not on file   Tobacco Use    Smoking status: Never    Smokeless tobacco: Never   Substance and Sexual Activity    Alcohol use: Not Currently    Drug use: Never    Sexual activity: Not on file   Other Topics Concern    Not on file   Social History Narrative    Not on file      Social Determinants of Health      Financial Resource Strain: Not on file   Food Insecurity: Not on file   Transportation Needs: Not on file   Physical Activity: Not on file   Stress: Not on  file   Social Connections: Not on file   Intimate Partner Violence: Not on file   Housing Stability: Not on file              Review of Systems  Review of Systems   Constitutional:  Negative for chills, fatigue, fever and unexpected weight change. HENT:  Negative for sore throat, trouble swallowing and voice change. Eyes:  Negative for visual disturbance. Respiratory:  Negative for cough, shortness of breath and wheezing. Cardiovascular:  Negative for chest pain and palpitations. Gastrointestinal:  Negative for abdominal pain, blood in stool, nausea and vomiting. Endocrine: Negative for cold intolerance, heat intolerance and polydipsia. Genitourinary:  Negative for dysuria, flank pain and hematuria. Musculoskeletal:  Positive for back pain and gait problem. Negative for joint swelling and myalgias. Skin:  Negative for color change and rash. Allergic/Immunologic: Negative for immunocompromised state. Neurological:  Negative for dizziness, tremors, seizures and speech difficulty. Hematological:  Does not bruise/bleed easily. Psychiatric/Behavioral:  Negative for agitation, behavioral problems and confusion. Reports chronic gait problems since back surgery in the past.  OBJECTIVE      /64 (Site: Left Upper Arm, Position: Sitting, Cuff Size: Medium Adult)   Pulse 68   Temp 97 °F (36.1 °C) (Temporal)   Ht 5' (1.524 m)   Wt 166 lb 8 oz (75.5 kg)   SpO2 97%   BMI 32.52 kg/m²       Physical Exam  Vitals reviewed. Constitutional:       Appearance: Normal appearance. She is well-developed. She is not diaphoretic. HENT:      Head: Normocephalic and atraumatic. Nose: No congestion or rhinorrhea. Eyes:      General: No scleral icterus. Extraocular Movements: Extraocular movements intact. Pupils: Pupils are equal, round, and reactive to light. Comments: glasses   Neck:      Thyroid: No thyromegaly. Vascular: No JVD. Trachea: No tracheal deviation. Cardiovascular:      Rate and Rhythm: Normal rate and regular rhythm. Heart sounds: Normal heart sounds. No murmur heard. Pulmonary:      Effort: No respiratory distress. Breath sounds: Normal breath sounds. No wheezing. Abdominal:      General: There is no distension. Palpations: Abdomen is soft. There is no mass. Tenderness: There is no abdominal tenderness. There is no guarding or rebound. Hernia: A hernia is present. Musculoskeletal:         General: No tenderness or deformity. Cervical back: Neck supple. No rigidity. Lymphadenopathy:      Cervical: No cervical adenopathy. Skin:     General: Skin is warm and dry. Coloration: Skin is not jaundiced or pale. Findings: No rash. Neurological:      General: No focal deficit present. Mental Status: She is alert and oriented to person, place, and time. Cranial Nerves: No cranial nerve deficit.    Psychiatric:         Mood and Affect: Mood normal.         Behavior: Behavior normal.               Lab Results   Component Value Date     WBC 6.4 03/23/2022     HGB 13.4 03/23/2022     HCT 40.5 03/23/2022      03/23/2022     CHOL 180 03/23/2022     TRIG 362 (H) 03/23/2022     HDL 36 (L) 03/23/2022     LDLDIRECT 101 (H) 03/23/2022     ALT 21 03/23/2022     AST 17 03/23/2022      03/23/2022     K 4.1 03/23/2022      03/23/2022     CREATININE 0.6 09/23/2022     BUN 21 (H) 03/23/2022     CO2 28 03/23/2022     TSH 2.633 03/23/2022

## 2023-03-01 NOTE — PROGRESS NOTES
824 Awake and oriented on arrival to PACU , pt c/o # 7 groin pain and lower ABD pain   830 medicated with Droperidol 0.625 mg and Dilaudid 0.5 mg IV  835 pain unchanged , medicated with Dilaudid 0.5 mg IV  840 states pain a # 4 and nausea gone BP dropped into the 80'S IV fluids wide open  850 eyes closed resp easy   900 Dr Pickett called and given and update on pt BP , no bleeding ot swelling noted at incisional sites , pt  asymptomatic   913 Dr Pickett to bedside finish given the liter hanging and reassess    928 IV fluids backed down  945 Dr Pickett updated about BP , pt asymptomatic no swelling noted   950 meets criteria for discharge , transported to [facility]

## 2023-03-01 NOTE — PROGRESS NOTES
Patient is still very sleepy and her O2 sats drop to upper 80's. Encouraged patient to deep breath and cough. Ambulated patient in hallway for 5 minutes to try to wake her up.

## 2023-03-01 NOTE — DISCHARGE INSTRUCTIONS
DR GOODWIN'S DISCHARGE INSTRUCTIONS    Pt Name: [address] Record Number: [id number]  Today's Date: 3/1/2023    GENERAL ANESTHESIA OR SEDATION  1. Do not drive or operate hazardous machinery for 24 hours. 2. Do not make important business or personal decisions for 24 hours. 3. Do not drink alcoholic beverages or use tobacco for 24 hours. ACTIVITY INSTRUCTIONS:  [] Rest today. Resume light to normal activity tomorrow.   [] You may resume normal activity tomorrow. Do not engage in strenuous activity that may place stress on your incision. [x] Do not drive for 3-5 days and avoid heavy lifting, tugging, pullings greater than 10-20 lbs until seen in the office. DIET INSTRUCTIONS:  []Begin with clear liquids. If not nauseated, may increase to a low-fat diet when you desire. Greasy and spicy foods are not advised. [x]Regular diet as tolerated. []Other:     MEDICATIONS  [x]Prescription sent with you to be used as directed. []Lortab   [x]Norco   []Percocet   []Tylenol #3   []Oxycontin   Do not drink alcohol or drive while taking these medications. You may experience dizziness or drowsiness with these medications. You may also experience constipation which can be relieved with stool softners or laxatives. [x]You may resume your daily prescription medication schedule unless otherwise specified. [x]Do not take 325mg Aspirin or other blood thinners such as Coumadin or Plavix for 5 days. WOUND/DRESSING INSTRUCTIONS:  Always ensure you and your care giver clean hands before and after caring for the wound. [] Keep dressing clean and dry for 48 hours. Change when soiled or wet. [] Allow steri-strips to fall off on their own.   [] Ice operative site for 20 minutes 4 times a day. [x] May wash over incision in shower in 24 hours, but do not soak in a bath.  [] Take sitz bath for 20 minutes twice daily and after bowel movements. [] Keep the abdominal binder in place during the day.  May remove to shower and at night.  [] Remove packing from wound in 24 hours and replace with AMD dressings daily. [] Empty JOSE drain daily and record the amounts. BREAST PROCEDURES  []Following a breast procedure, it is important to continue to where supportive garments. []Following a sentinal lymph node biopsy, you should not be alarmed if your urine has a blue color to it. This is your body eliminating the dye used for the procedure. ABDOMINAL/LAPAROSCOPIC SURGERY  [x]You are encouraged to get up and move around as this helps with the circulation and speeds up the healing process. [x]Breath deeply and cough from time to time. This helps to clear your lungs and helps prevent pneumonia. [x]Supporting your incision with a pillow or your hand helps to minimize discomfort and pain. []Laparoscopic patients may develop shoulder pain in the first 48 hours from the gas used during the procedure. FOLLOW-UP CARE. SPECIFICALLY WATCH FOR:   Fever over 101 degrees by mouth   Increased redness, warmth, hardness at operative site. Blood soaked dressing (small amounts of oozing may be normal.)   Increased or progressive drainage from the surgical area   Inability to urinate or blood in the urine   Pain not relieved by the medications ordered   Persistent nausea and/or vomiting, unable to retain fluids. FOLLOW-UP APPOINTMENT   []1 week   [x]2 weeks   []Other    Call my office if you have any problem that concerns you [phone]. After hours, you can reach the answering service via the office phone number. IF YOU NEED IMMEDIATE ATTENTION, GO TO THE EMERGENCY ROOM AND YOUR DOCTOR WILL BE CONTACTED. Tala Altman MD MD  [address] [place] [phone]  [facility] MAXIM MCCANN II.FABIENNE, [facility]  Electronically signed 3/1/2023 at 11:53 AM

## 2023-03-01 NOTE — PROGRESS NOTES
ADMITTED TO [facility] AND ORIENTED TO UNIT. SCDS ON. FALL AND ALLERGY BANDS ON. PT VERBALIZED APPROVAL FOR FIRST NAME, LAST INITIAL AND PHYSICIAN NAME ON UNIT WHITEBOARD.

## 2023-03-01 NOTE — PROGRESS NOTES
Pt returned to [facility] room 9. Vitals and assessment as charted. 0.9 infusing, @950ml to count from PACU. Pt has crackers and water. Family at the bedside. Pt and family verbalized understanding of discharge criteria and call light use. Call light in reach.

## 2023-03-01 NOTE — OP NOTE
[facility]  Operative Report    PATIENT NAME: Dragan Jain RECORD NO. [id number]  SURGEON: Nitin Gustafson MD   Primary Care Physician: Citlalli Morton MD  Date: 3/1/2023, 8:22 AM     PROCEDURE PERFORMED: Abdominal pelvic laparoscopy. Left groin exploration with excision of 10 cm lobulated left inguinal lipoma and biopsy left deep femoral lymph nodes  PREOPERATIVE DIAGNOSIS: Left inguinal hernia  Active Hospital Problems    Diagnosis Date Noted    Lipoma of left lower extremity [D17.24] 03/01/2023     Priority: Medium    Lymphadenopathy, inguinal [R59.0] 03/01/2023     Priority: Medium      POSTOPERATIVE DIAGNOSIS: Same, path pending  SURGEON:  Nitin Gustafson MD   ANESTHESIA:  General endotracheal anesthesia and local  ANESTHESIA:  20  ml OF 0.5% Marcaine   ESTIMATED BLOOD LOSS:  < 5  ml  SPECIMEN: 1. Inguinal lipoma 2. Left deep femoral lymph nodes  COMPLICATIONS:  None; patient tolerated the procedure well. DRAINS: none  DISPOSITION: Recovery Room  CONDITION: stable      Narrative: Indications see history and physical examination. Procedure: Patient was brought to the operating suite placed supine on the operating table with pneumatic sequential compression devices on the lower extremities. She was given clindamycin intravenously. After induction of general anesthetic pelvic area was clipped and the abdomen and pelvis were prepped and draped in the usual sterile fashion. Incision was made above the umbilicus after timeout was performed. Was carried down to the fascia. The fascia was elevated and incised. Hemostat bluntly divided the peritoneum. 8 mm port was inserted and CO2 pneumoperitoneum was introduced. Patient was placed in reverse Trendelenburg. The scope was inserted. Pelvic region was seen there was no evidence of inguinal or femoral hernia on either side. There were some omental adhesions in the upper abdomen from previous large right upper quadrant incision. There were no abnormalities of the liver or bowel loops seen. An additional port had been placed in the left lateral abdomen to assist in retraction of omentum and bowel out of the pelvis. Laparoscope and ports were then removed. CO2 pneumoperitoneum was suctioned from the abdominal cavity. Attention was then turned toward the palpable mass in the left groin. Just above the inguinal crease skin incision was made. Mary Carmen's fascia was opened and there was a lobulated fatty mass. There is no palpable evidence of inguinal hernia. This lobulated mass was sent as a specimen. Further exploration revealed mildly enlarged deep left femoral lymph node which was over the left femoral vein. These were excised there was no evidence of bleeding and sent fresh to pathology. The inguinal wound was closed in layers of absorbable suture. Skin was closed running subcuticular Monocryl suture. The umbilical port site fascia was closed with 0 Ethibond suture all skin incisions were closed in subcuticular fashion and skin glue applied. Sponge sharp and instrument counts were correct. Patient was transported to the recovery area in stable condition.

## 2023-03-01 NOTE — ANESTHESIA POSTPROCEDURE EVALUATION
Department of Anesthesiology  Postprocedure Note    Patient: Robin Anglin  MRN: [id number]  YOB: 1955  Date of evaluation: 3/1/2023      Procedure Summary     Date: 03/01/23 Room / Location: [address] [place] [address]    Anesthesia Start: 2380 Anesthesia Stop: Lenice Olp    Procedure: ROBOTIC diagnostic laparoscopy, left groin exploration and excision of lipoma and lymph node (Left: Abdomen) Diagnosis:       Left inguinal hernia      (Left inguinal hernia [K40.90])    Surgeons: Corey Alonso MD Responsible Provider: Adriane Venegas MD    Anesthesia Type: general ASA Status: 3          Anesthesia Type: No value filed.     Ford Phase I: Ford Score: 9    Ford Phase II: Ford Score: 10      Anesthesia Post Evaluation    Patient location during evaluation: PACU  Patient participation: complete - patient participated  Level of consciousness: awake and alert  Airway patency: patent  Nausea & Vomiting: no nausea  Complications: no  Cardiovascular status: blood pressure returned to baseline and hemodynamically stable  Respiratory status: acceptable and spontaneous ventilation  Hydration status: euvolemic

## 2023-03-02 ENCOUNTER — TELEPHONE (OUTPATIENT)
Dept: SURGERY | Age: 68
End: 2023-03-02

## 2023-03-02 NOTE — TELEPHONE ENCOUNTER
Left msg to see how pt is doing s/p abdominal pelvic laparoscopy. Left groin exploration with excision of 10 cm lobulated left inguinal lipoma and biopsy left deep femoral lymph nodes on 3/01/23?

## 2023-03-16 ENCOUNTER — OFFICE VISIT (OUTPATIENT)
Dept: SURGERY | Age: 68
End: 2023-03-16

## 2023-03-16 VITALS
TEMPERATURE: 97.6 F | BODY MASS INDEX: 32.57 KG/M2 | RESPIRATION RATE: 16 BRPM | HEART RATE: 71 BPM | SYSTOLIC BLOOD PRESSURE: 124 MMHG | DIASTOLIC BLOOD PRESSURE: 66 MMHG | WEIGHT: 165.9 LBS | OXYGEN SATURATION: 95 % | HEIGHT: 60 IN

## 2023-03-16 DIAGNOSIS — D17.24 LIPOMA OF LEFT LOWER EXTREMITY: Primary | ICD-10-CM

## 2023-03-16 DIAGNOSIS — I89.8 LYMPHOCOELE: ICD-10-CM

## 2023-03-16 DIAGNOSIS — R59.0 LYMPHADENOPATHY, INGUINAL: ICD-10-CM

## 2023-03-16 PROCEDURE — 99024 POSTOP FOLLOW-UP VISIT: CPT | Performed by: SURGERY

## 2023-03-16 NOTE — PROGRESS NOTES
Humza Velasquez MD   General Surgery  Postprocedure Evaluation in Office  Pt Name: Matty Bolaños  Date of Birth 1955   Today's Date: 3/16/2023  Medical Record Number: [id number]  Primary Care Provider: Yesi Victoria MD  Chief Complaint   Patient presents with    Post-Op Check     S/P Abdominal pelvic laparoscopy. Left groin exploration with excision of 10 cm lobulated left inguinal lipoma and biopsy left deep femoral lymph nodes 3/1/23     ASSESSMENT      1. Lipoma of left lower extremity    2. Lymphadenopathy, inguinal    3. Lymphocoele    Postop spontaneously drained     PLAN       Pathology discussed with patient. Benign findings on lymph node  Postoperative lymphocele spontaneously drained. Monitor for recurrence. Je Garcia is seen today for post-op follow-up. She is status post laparoscopy. No inguinal hernia evident on examination. She then had left groin exploration she did have a rather large lipoma and lymph node which was biopsied. Pathology benign no malignancy. She did develop a seroma/lymphocele postop which spontaneously drained. Clear serous fluid drained. No signs of infection. She was reassured and all questions answered.   Still has complaints of some fatigue and some chronic back pain  Medications    Current Outpatient Medications:     pantoprazole (PROTONIX) 40 MG tablet, TAKE 1 TABLET BY MOUTH ONCE DAILY, Disp: , Rfl:     potassium citrate (UROCIT-K) 10 MEQ (1080 MG) extended release tablet, Take 10 mEq by mouth in the morning and at bedtime, Disp: , Rfl:     Coenzyme Q10 (COQ10) 200 MG CAPS, Take 200 mg by mouth daily, Disp: , Rfl:     Naproxen Sodium 220 MG CAPS, Take by mouth daily, Disp: , Rfl:     acetaminophen (TYLENOL) 325 MG tablet, Take 650 mg by mouth every 6 hours as needed for Pain, Disp: , Rfl:     aspirin 325 MG EC tablet, Take 325 mg by mouth daily, Disp: , Rfl:     bisoprolol-hydroCHLOROthiazide (ZIAC) 10-6.25 MG per tablet, Take 1 tablet by mouth daily, Disp: , Rfl:     gemfibrozil (LOPID) 600 MG tablet, Take 600 mg by mouth 2 times daily (before meals), Disp: , Rfl:     losartan (COZAAR) 25 MG tablet, Take 25 mg by mouth daily, Disp: , Rfl:     niacin (SLO-NIACIN) 500 MG extended release tablet, Take 500 mg by mouth nightly, Disp: , Rfl:     Vitamin D (CHOLECALCIFEROL) 25 MCG (1000 UT) TABS tablet, Take 2,000 Units by mouth daily, Disp: , Rfl:     Allergies  Allergies   Allergen Reactions    Cephalosporins Anaphylaxis    Cymbalta [Duloxetine Hcl] Hives    Gabapentin Hives       OBJECTIVE     VITALS: /66   Pulse 71   Temp 97.6 °F (36.4 °C)   Resp 16   Ht 5' (1.524 m)   Wt 165 lb 14.4 oz (75.3 kg)   SpO2 95%   BMI 32.40 kg/m²     CONSTITUTIONAL: Alert and oriented times 3, no acute distress and cooperative to examination. SKIN: Skin color, texture, turgor normal. No rashes or lesions. INCISION: wound margins intact and healing well. No signs of infection. No drainage. LUNGS: Lungs Clear  CARDIOVASCULAR: Normal Rate  ABDOMEN: Soft nondistended   NEUROLOGIC: No sensory or motor nerve irritation      Performed by: [place], [place]              [phone]   [facility], [address]                                                       PROC: 2023   [facility]                                    RECV: 2023   [address]                                    RPTD: 2023   [facility], [address]                       MRN:  [id number]    LOC: OR                       ACCT: [de-identified]  SEX: F                       : 1955  AGE: 79 Y                          PATHOLOGY REPORT                       ATTN: Candace GOODWIN       Copies To:   Juan Jose Mijares       Clinical Information: LEFT INGUINAL HERNIA     FINAL DIAGNOSIS:   A.   Lymph node, left inguinal, resection:     Reactive lymph node with fatty replacement. B.  Lipoma, left groin, resection:     Lipoma. Specimen:   A) LYMPH NODE(S), LEFT INGUINAL   B) LIPOMA, LEFT GROIN       Gross Examination:   A - The container is labeled Foster Goodpasture, left inguinal lymph   node. Received fresh is a fragment of yellow adipose tissue measuring   about 4 x 3 x 1 cm. Sections through the specimen reveal two   tentatively identified lymph nodes measuring about 0.5 and 1.5 cm. The   lymph nodes have a yellow-pink cut surface. The smaller lymph node is   submitted as received. Representative sections of the larger node is   then submitted. 1 ss. B - The container is labeled Foster Goodpasture, lipoma, left groin. Received in formalin are fragments of yellow adipose tissue aggregating   to 6.5 x 5.5 x about 3 cm. Sections through the fragments reveal   yellow fatty cut surfaces. There is no hemorrhage or necrosis. Representative sections are submitted in three cassettes. ss.   [id number]/[id number]     Microscopic Examination:   A.  A bit of fatty replaced lymph nodes shows cautery artifact and   reactive changes. There is no evidence of neoplasia. B.  Multiple levels of the tissue demonstrate mature adipose tissue. There is no evidence of cytological atypia. [id number]   [id number]                                                       <Sign Out Dr. Zeina Denny M.D., F.C.A.P.        [facility]  Printed on:  3/2/2023   [address]   Lashae Brooke, [address] [employer]   Original print date: 03/02/2023      Specimen Collected: 03/01/23 13:00 EST Last Resulted: 03/02/23 13:10 EST       Order Details     View Encounter     Lab and Collection Details

## 2023-03-30 ENCOUNTER — OFFICE VISIT (OUTPATIENT)
Dept: SURGERY | Age: 68
End: 2023-03-30

## 2023-03-30 VITALS
HEART RATE: 78 BPM | WEIGHT: 168 LBS | OXYGEN SATURATION: 98 % | TEMPERATURE: 97.5 F | SYSTOLIC BLOOD PRESSURE: 128 MMHG | HEIGHT: 60 IN | BODY MASS INDEX: 32.98 KG/M2 | DIASTOLIC BLOOD PRESSURE: 72 MMHG | RESPIRATION RATE: 18 BRPM

## 2023-03-30 DIAGNOSIS — R59.0 LYMPHADENOPATHY, INGUINAL: ICD-10-CM

## 2023-03-30 DIAGNOSIS — Z48.89 ENCOUNTER FOR POSTOPERATIVE CARE: ICD-10-CM

## 2023-03-30 DIAGNOSIS — I89.8 LYMPHOCOELE: ICD-10-CM

## 2023-03-30 DIAGNOSIS — D17.24 LIPOMA OF LEFT LOWER EXTREMITY: Primary | ICD-10-CM

## 2023-03-30 NOTE — PROGRESS NOTES
Matt Odonnell MD   General Surgery  Postprocedure Evaluation in Office  Pt Name: Reyes Scott  Date of Birth 1955   Today's Date: 3/30/2023  Medical Record Number: [id number]  Primary Care Provider: Lena Gutierrez MD  Chief Complaint   Patient presents with    Post-Op Check     S/P Abdominal pelvic laparoscopy. Left groin exploration with excision of 10 cm lobulated left inguinal lipoma and biopsy left deep femoral lymph nodes 3/1/23-Last seen 3/16/23     ASSESSMENT      1. Lipoma of left lower extremity    2. Lymphadenopathy, inguinal    3. Lymphocoele    4. Encounter for postoperative care      Postop spontaneously drained lymphocele. markedly decreasing. PLAN       Pathology discussed with patient. Benign findings on lymph node  Postoperative lymphocele spontaneously drained. No clinical evidence of significant recurrence   3. Activity as tolerated. Follow-up surgical clinic as needed. Pedro Clarke is seen today for post-op follow-up. She is status post laparoscopy. No inguinal hernia evident on examination. She then had left groin exploration she did have a rather large lipoma and lymph node which was biopsied. Pathology benign no malignancy. She did develop a seroma/lymphocele postop which spontaneously drained. Clear serous fluid drained. No signs of infection. She was reassured and all questions answered. Still has complaints of some fatigue and some chronic back pain    Interval history: Christopher Olivarez states she is feeling much better she is ambulating better. No significant recurrence of lymphocele. No drainage today. She states small amounts of spontaneously drain since her last visit. There is no erythema.     Medications    Current Outpatient Medications:     pantoprazole (PROTONIX) 40 MG tablet, TAKE 1 TABLET BY MOUTH ONCE DAILY, Disp: , Rfl:     potassium citrate (UROCIT-K) 10 MEQ (1080 MG) extended release tablet, Take 10 mEq by mouth in the morning and at bedtime,

## (undated) DEVICE — ADHESIVE SKIN CLSR 0.7ML TOP DERMBND ADV

## (undated) DEVICE — TIP COVER ACCESSORY

## (undated) DEVICE — GLOVE SURG SZ 7 L12IN FNGR THK94MIL TRNSLUC YEL LTX HYDRGEL

## (undated) DEVICE — COLUMN DRAPE

## (undated) DEVICE — SUTURE ETHBND D SPEC NO 0 UR 6 30IN D9436

## (undated) DEVICE — SUTURE ETHBND EXCEL SZ 2-0 L30IN NONABSORBABLE GRN L26MM SH X833H

## (undated) DEVICE — YANKAUER,BULB TIP,W/O VENT,RIGID,STERILE: Brand: MEDLINE

## (undated) DEVICE — ELECTRO LUBE IS A SINGLE PATIENT USE DEVICE THAT IS INTENDED TO BE USED ON ELECTROSURGICAL ELECTRODES TO REDUCE STICKING.: Brand: KEY SURGICAL ELECTRO LUBE

## (undated) DEVICE — BLADELESS OBTURATOR: Brand: WECK VISTA

## (undated) DEVICE — SUTURE VCRL + SZ 0 L18IN ABSRB TIE VCP106G

## (undated) DEVICE — CANNULA SEAL

## (undated) DEVICE — TUBING INSUFFLATOR HEAT HUMIDIFIED SMK EVAC SET PNEUMOCLEAR

## (undated) DEVICE — GOWN,SIRUS,NON REINFRCD,LARGE,SET IN SL: Brand: MEDLINE

## (undated) DEVICE — 35 ML SYRINGE LUER-LOCK TIP: Brand: MONOJECT

## (undated) DEVICE — GENERAL LAPAROSCOPY-LF: Brand: MEDLINE INDUSTRIES, INC.

## (undated) DEVICE — INTENDED FOR TISSUE SEPARATION, AND OTHER PROCEDURES THAT REQUIRE A SHARP SURGICAL BLADE TO PUNCTURE OR CUT.: Brand: BARD-PARKER ® CARBON RIB-BACK BLADES

## (undated) DEVICE — ARM DRAPE

## (undated) DEVICE — BASIC SINGLE BASIN BTC-LF: Brand: MEDLINE INDUSTRIES, INC.

## (undated) DEVICE — TUBING, SUCTION, 1/4" X 20', STRAIGHT: Brand: MEDLINE INDUSTRIES, INC.